# Patient Record
Sex: MALE | Race: BLACK OR AFRICAN AMERICAN | NOT HISPANIC OR LATINO | URBAN - METROPOLITAN AREA
[De-identification: names, ages, dates, MRNs, and addresses within clinical notes are randomized per-mention and may not be internally consistent; named-entity substitution may affect disease eponyms.]

---

## 2017-02-15 NOTE — PATIENT DISCUSSION
CATARACTS, Os - VISUALLY SIGNIFICANT. SCHEDULE od  DISCUSSED OPTION OF ___________STANDARD_____VS __________________. PATIENT UNDERSTANDS AND DESIRES _________STANDARD____________________. ALSO DISCUSSED CAN SET OS FOR NEAR SO PATIENT WILL HAVE MONO VISION PERMANENTLY AFTER PCIOL.

## 2017-02-15 NOTE — PATIENT DISCUSSION
Surgery Counseling: I have discussed the option of scheduling surgery versus following, as well as the risks, benefits and alternatives of cataract surgery with the patient. It was explained that the surgery is medically indicated at this time, and it can be performed at the patient's option as delaying will cause no further deterioration, therefore there is no rush and there is no harm in waiting to have surgery. It was also explained that there is no guarantee that removing the cataract will improve their vision. The patient understands and desires to proceed with cataract surgery with the implantation of an intraocular lens to improve vision for _driving__. I have given the patient the prescribed regimen of the all-in-one drop to use before and after cataract surgery. They have elected to use the all-in-one option of Pred/Gati/Nepaf(prednisolone acetate,gatifloxacin,and nepafenac. Patient to administer as directed.

## 2017-03-08 NOTE — PATIENT DISCUSSION
S/P PE IOL, OS. DOING WELL. CONTINUE DROPS AS DIRECTED. SPECS RX OFFERED. RX ARC IN SPECS TO MINIMIZE GLARE. RETURN FOR FOLLOW-UP AS SCHEDULED.

## 2017-05-15 NOTE — PATIENT DISCUSSION
New Prescription: erythromycin (erythromycin): ointment: 5 mg/gram (0.5 %) a small amount at bedtime as directed into affected eye 05-

## 2017-05-22 NOTE — PATIENT DISCUSSION
Continue: erythromycin (erythromycin): ointment: 5 mg/gram (0.5 %) a small amount at bedtime as directed into affected eye 05-

## 2019-03-06 NOTE — PATIENT DISCUSSION
MODERATE DRY EYES: PRESCRIBED ARTIFICIAL TEARS BID-QID A DAY, OU AND CONSIDER ADDING NIGHTLY LUBRICATING OINTMENT OR GEL. WILL CONSIDER RESTASIS OR PUNCTAL PLUGS TREATMENT NEXT VISIT IF NOT RESPONSIVE OR IF SYMPTOMS PERSIST. RETURN FOR FOLLOW-UP AS SCHEDULED OR SOONER IF SYMPTOMS WORSEN.

## 2019-03-06 NOTE — PATIENT DISCUSSION
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 SCHEDULE YAG CAPSULOTOMY OS___ FIRST THEN LATER YAG CAPSULOTOMY OD___ IF VISUAL SYMPTOMS PERSIST.

## 2020-09-08 ENCOUNTER — IMPORTED ENCOUNTER (OUTPATIENT)
Dept: URBAN - METROPOLITAN AREA CLINIC 9 | Facility: CLINIC | Age: 65
End: 2020-09-08

## 2021-03-19 ENCOUNTER — IMPORTED ENCOUNTER (OUTPATIENT)
Dept: URBAN - METROPOLITAN AREA CLINIC 9 | Facility: CLINIC | Age: 66
End: 2021-03-19

## 2021-06-14 ENCOUNTER — IMPORTED ENCOUNTER (OUTPATIENT)
Dept: URBAN - METROPOLITAN AREA CLINIC 9 | Facility: CLINIC | Age: 66
End: 2021-06-14

## 2021-08-01 NOTE — PATIENT DISCUSSION
Posterior Capsular Fibrosis Counseling: The diagnosis of posterior capsular fibrosis (PCF), also referred to as a secondary cataract or posterior capsular opacification (PCO), was discussed with the patient. The patient understands that their symptoms and limitations are likely related to this condition. I have reviewed the risks, benefits and alternatives of  YAG laser surgery for the treatment of the fibrosis. The uncommon risk of an increase in intraocular pressure or a retinal detachment and their associated symptoms were explained to the patient. The patient understands and desires to proceed with the laser surgery to improve their vision for ___TV________. Inadequate energy intake.../Loss of subcutaneous fat.../Loss of muscle...

## 2021-10-16 ASSESSMENT — VISUAL ACUITY
OD_CC: 20/20 SN
OS_CC: 20/30 - SN
OD_CC: 20/25 - SN
OS_PH: 20/70 + SN
OD_SC: 20/200 SN
OS_CC: 20/30 +3 SN
OS_SC: 20/100 SN
OS_CC: 20/30 SN
OD_CC: 20/20 SN
OD_PH: 20/30 -2 SN

## 2021-10-16 ASSESSMENT — TONOMETRY
OS_IOP_MMHG: 23
OS_IOP_MMHG: 17
OD_IOP_MMHG: 18
OD_IOP_MMHG: 14
OS_IOP_MMHG: 19
OD_IOP_MMHG: 16

## 2021-12-14 ENCOUNTER — ESTABLISHED PATIENT (OUTPATIENT)
Dept: URBAN - METROPOLITAN AREA CLINIC 15 | Facility: CLINIC | Age: 66
End: 2021-12-14

## 2021-12-14 DIAGNOSIS — H25.11: ICD-10-CM

## 2021-12-14 DIAGNOSIS — H40.1130: ICD-10-CM

## 2021-12-14 PROCEDURE — 92083 EXTENDED VISUAL FIELD XM: CPT

## 2021-12-14 PROCEDURE — 92014 COMPRE OPH EXAM EST PT 1/>: CPT

## 2021-12-14 ASSESSMENT — TONOMETRY
OS_IOP_MMHG: 17
OD_IOP_MMHG: 17

## 2022-01-10 ENCOUNTER — TECH ONLY (OUTPATIENT)
Dept: URBAN - METROPOLITAN AREA CLINIC 15 | Facility: CLINIC | Age: 67
End: 2022-01-10

## 2022-01-10 DIAGNOSIS — H25.11: ICD-10-CM

## 2022-01-10 DIAGNOSIS — H40.1130: ICD-10-CM

## 2022-01-10 PROCEDURE — 99211T TECH SERVICE

## 2022-01-10 PROCEDURE — 76511 OPH US DX QUAN A-SCAN ONLY: CPT

## 2022-01-10 ASSESSMENT — VISUAL ACUITY
OD_CC: 20/25
OS_CC: 20/30

## 2022-01-10 ASSESSMENT — TONOMETRY
OS_IOP_MMHG: 22
OD_IOP_MMHG: 16

## 2022-03-10 PROBLEM — Z98.41: Noted: 2022-03-10

## 2022-03-11 ENCOUNTER — POST-OP (OUTPATIENT)
Dept: URBAN - METROPOLITAN AREA CLINIC 15 | Facility: CLINIC | Age: 67
End: 2022-03-11

## 2022-03-11 DIAGNOSIS — Z98.41: ICD-10-CM

## 2022-03-11 DIAGNOSIS — Z96.1: ICD-10-CM

## 2022-03-11 DIAGNOSIS — H40.1130: ICD-10-CM

## 2022-03-11 PROCEDURE — 99024 POSTOP FOLLOW-UP VISIT: CPT

## 2022-03-11 ASSESSMENT — VISUAL ACUITY
OS_SC: 20/400
OU_SC: 20/20-1
OD_SC: 20/20-1

## 2022-03-11 ASSESSMENT — TONOMETRY
OD_IOP_MMHG: 40
OS_IOP_MMHG: 23

## 2022-04-12 ENCOUNTER — POST-OP (OUTPATIENT)
Dept: URBAN - METROPOLITAN AREA CLINIC 15 | Facility: CLINIC | Age: 67
End: 2022-04-12

## 2022-04-12 DIAGNOSIS — H40.1130: ICD-10-CM

## 2022-04-12 DIAGNOSIS — Z96.1: ICD-10-CM

## 2022-04-12 DIAGNOSIS — Z98.41: ICD-10-CM

## 2022-04-12 PROCEDURE — 99024 POSTOP FOLLOW-UP VISIT: CPT

## 2022-04-12 RX ORDER — CARTEOLOL HYDROCHLORIDE 10 MG/ML: 1 SOLUTION OPHTHALMIC ONCE A DAY

## 2022-04-12 ASSESSMENT — VISUAL ACUITY
OD_SC: 20/20-1
OS_SC: 20/70-1

## 2022-04-12 ASSESSMENT — TONOMETRY
OS_IOP_MMHG: 19
OD_IOP_MMHG: 15

## 2022-08-16 ENCOUNTER — ESTABLISHED PATIENT (OUTPATIENT)
Dept: URBAN - METROPOLITAN AREA CLINIC 15 | Facility: CLINIC | Age: 67
End: 2022-08-16

## 2022-08-16 DIAGNOSIS — Z96.1: ICD-10-CM

## 2022-08-16 DIAGNOSIS — H40.1123: ICD-10-CM

## 2022-08-16 DIAGNOSIS — H40.1112: ICD-10-CM

## 2022-08-16 PROCEDURE — 99214 OFFICE O/P EST MOD 30 MIN: CPT

## 2022-08-16 ASSESSMENT — TONOMETRY
OD_IOP_MMHG: 15
OS_IOP_MMHG: 18

## 2022-08-16 ASSESSMENT — VISUAL ACUITY
OS_SC: 20/50+2
OD_SC: 20/25